# Patient Record
Sex: FEMALE | Race: WHITE
[De-identification: names, ages, dates, MRNs, and addresses within clinical notes are randomized per-mention and may not be internally consistent; named-entity substitution may affect disease eponyms.]

---

## 2019-12-25 ENCOUNTER — HOSPITAL ENCOUNTER (EMERGENCY)
Dept: HOSPITAL 9 - MADERS | Age: 44
Discharge: HOME | End: 2019-12-25
Payer: SELF-PAY

## 2019-12-25 DIAGNOSIS — J11.1: Primary | ICD-10-CM

## 2019-12-25 PROCEDURE — 96374 THER/PROPH/DIAG INJ IV PUSH: CPT

## 2019-12-25 PROCEDURE — 96361 HYDRATE IV INFUSION ADD-ON: CPT

## 2019-12-25 PROCEDURE — 71046 X-RAY EXAM CHEST 2 VIEWS: CPT

## 2019-12-25 NOTE — RAD
EXAM:

Two views chest



PROVIDED CLINICAL HISTORY:

Productive cough and fever



COMPARISON:

None



FINDINGS:

Cardiac silhouette is at the upper limits normal in size. The pulmonary vasculature is within normal 
limits. The lungs are clear. The osseous structures have a normal appearance. Surgical clips

overlie the upper abdomen.



IMPRESSION:

No acute cardiopulmonary process.



Reported By: Efren Short 

Electronically Signed:  12/25/2019 9:47 PM